# Patient Record
Sex: MALE | Race: WHITE | NOT HISPANIC OR LATINO | URBAN - METROPOLITAN AREA
[De-identification: names, ages, dates, MRNs, and addresses within clinical notes are randomized per-mention and may not be internally consistent; named-entity substitution may affect disease eponyms.]

---

## 2020-04-24 ENCOUNTER — OFFICE (OUTPATIENT)
Dept: URBAN - METROPOLITAN AREA TELEHEALTH 3 | Facility: TELEHEALTH | Age: 45
End: 2020-04-24
Payer: COMMERCIAL

## 2020-04-24 VITALS — WEIGHT: 205 LBS | HEIGHT: 73 IN

## 2020-04-24 DIAGNOSIS — R42 DIZZINESS AND GIDDINESS: ICD-10-CM

## 2020-04-24 DIAGNOSIS — R10.12 LEFT UPPER QUADRANT PAIN: ICD-10-CM

## 2020-04-24 DIAGNOSIS — R11.2 NAUSEA WITH VOMITING, UNSPECIFIED: ICD-10-CM

## 2020-04-24 DIAGNOSIS — R63.4 ABNORMAL WEIGHT LOSS: ICD-10-CM

## 2020-04-24 DIAGNOSIS — R43.2 PARAGEUSIA: ICD-10-CM

## 2020-04-24 PROCEDURE — 99204 OFFICE O/P NEW MOD 45 MIN: CPT | Mod: 95 | Performed by: INTERNAL MEDICINE

## 2020-04-30 ENCOUNTER — ON CAMPUS - OUTPATIENT (OUTPATIENT)
Dept: URBAN - METROPOLITAN AREA HOSPITAL 37 | Facility: HOSPITAL | Age: 45
End: 2020-04-30

## 2020-04-30 DIAGNOSIS — K31.7 POLYP OF STOMACH AND DUODENUM: ICD-10-CM

## 2020-04-30 DIAGNOSIS — R63.4 ABNORMAL WEIGHT LOSS: ICD-10-CM

## 2020-04-30 DIAGNOSIS — K92.0 HEMATEMESIS: ICD-10-CM

## 2020-04-30 DIAGNOSIS — K29.60 OTHER GASTRITIS WITHOUT BLEEDING: ICD-10-CM

## 2020-04-30 DIAGNOSIS — R11.2 NAUSEA WITH VOMITING, UNSPECIFIED: ICD-10-CM

## 2020-04-30 PROCEDURE — 43239 EGD BIOPSY SINGLE/MULTIPLE: CPT | Performed by: INTERNAL MEDICINE

## 2021-03-12 ENCOUNTER — OFFICE (OUTPATIENT)
Dept: URBAN - METROPOLITAN AREA CLINIC 102 | Facility: CLINIC | Age: 46
End: 2021-03-12

## 2021-03-12 ENCOUNTER — TELEHEALTH PROVIDED OTHER THAN IN PATIENT'S HOME (OUTPATIENT)
Dept: URBAN - METROPOLITAN AREA TELEHEALTH 12 | Facility: TELEHEALTH | Age: 46
End: 2021-03-12

## 2021-03-12 VITALS — WEIGHT: 200 LBS | HEIGHT: 73 IN

## 2021-03-12 DIAGNOSIS — R15.2 FECAL URGENCY: ICD-10-CM

## 2021-03-12 DIAGNOSIS — K92.1 MELENA: ICD-10-CM

## 2021-03-12 DIAGNOSIS — R19.4 CHANGE IN BOWEL HABIT: ICD-10-CM

## 2021-03-12 DIAGNOSIS — R32 UNSPECIFIED URINARY INCONTINENCE: ICD-10-CM

## 2021-03-12 PROCEDURE — 00038: CPT | Performed by: INTERNAL MEDICINE

## 2021-03-12 PROCEDURE — 99214 OFFICE O/P EST MOD 30 MIN: CPT | Mod: 95 | Performed by: INTERNAL MEDICINE

## 2021-03-22 ENCOUNTER — OFFICE (OUTPATIENT)
Dept: URBAN - METROPOLITAN AREA CLINIC 34 | Facility: CLINIC | Age: 46
End: 2021-03-22
Payer: COMMERCIAL

## 2021-03-22 DIAGNOSIS — Z11.59 ENCOUNTER FOR SCREENING FOR OTHER VIRAL DISEASES: ICD-10-CM

## 2021-03-22 PROCEDURE — G2023 SPECIMEN COLLECT COVID-19: HCPCS | Performed by: INTERNAL MEDICINE

## 2021-03-25 ENCOUNTER — OFFICE (OUTPATIENT)
Dept: URBAN - METROPOLITAN AREA CLINIC 98 | Facility: CLINIC | Age: 46
End: 2021-03-25

## 2021-03-25 VITALS
DIASTOLIC BLOOD PRESSURE: 54 MMHG | SYSTOLIC BLOOD PRESSURE: 97 MMHG | HEART RATE: 76 BPM | HEIGHT: 73 IN | SYSTOLIC BLOOD PRESSURE: 118 MMHG | HEART RATE: 83 BPM | HEART RATE: 79 BPM | DIASTOLIC BLOOD PRESSURE: 71 MMHG | DIASTOLIC BLOOD PRESSURE: 67 MMHG | SYSTOLIC BLOOD PRESSURE: 101 MMHG | HEART RATE: 81 BPM | HEART RATE: 83 BPM | HEART RATE: 78 BPM | SYSTOLIC BLOOD PRESSURE: 108 MMHG | HEART RATE: 79 BPM | OXYGEN SATURATION: 92 % | DIASTOLIC BLOOD PRESSURE: 71 MMHG | DIASTOLIC BLOOD PRESSURE: 67 MMHG | TEMPERATURE: 97.6 F | SYSTOLIC BLOOD PRESSURE: 131 MMHG | HEART RATE: 74 BPM | SYSTOLIC BLOOD PRESSURE: 106 MMHG | DIASTOLIC BLOOD PRESSURE: 69 MMHG | RESPIRATION RATE: 17 BRPM | SYSTOLIC BLOOD PRESSURE: 110 MMHG | OXYGEN SATURATION: 99 % | HEART RATE: 82 BPM | SYSTOLIC BLOOD PRESSURE: 116 MMHG | SYSTOLIC BLOOD PRESSURE: 118 MMHG | HEART RATE: 76 BPM | RESPIRATION RATE: 16 BRPM | HEART RATE: 82 BPM | DIASTOLIC BLOOD PRESSURE: 68 MMHG | SYSTOLIC BLOOD PRESSURE: 110 MMHG | DIASTOLIC BLOOD PRESSURE: 68 MMHG | RESPIRATION RATE: 20 BRPM | HEART RATE: 74 BPM | DIASTOLIC BLOOD PRESSURE: 83 MMHG | RESPIRATION RATE: 14 BRPM | DIASTOLIC BLOOD PRESSURE: 83 MMHG | SYSTOLIC BLOOD PRESSURE: 131 MMHG | OXYGEN SATURATION: 99 % | SYSTOLIC BLOOD PRESSURE: 106 MMHG | RESPIRATION RATE: 20 BRPM | SYSTOLIC BLOOD PRESSURE: 108 MMHG | SYSTOLIC BLOOD PRESSURE: 116 MMHG | OXYGEN SATURATION: 100 % | DIASTOLIC BLOOD PRESSURE: 81 MMHG | RESPIRATION RATE: 17 BRPM | RESPIRATION RATE: 18 BRPM | HEIGHT: 73 IN | RESPIRATION RATE: 14 BRPM | TEMPERATURE: 97.6 F | RESPIRATION RATE: 16 BRPM | OXYGEN SATURATION: 92 % | OXYGEN SATURATION: 98 % | TEMPERATURE: 97.5 F | SYSTOLIC BLOOD PRESSURE: 133 MMHG | RESPIRATION RATE: 18 BRPM | DIASTOLIC BLOOD PRESSURE: 81 MMHG | DIASTOLIC BLOOD PRESSURE: 72 MMHG | WEIGHT: 200 LBS | DIASTOLIC BLOOD PRESSURE: 72 MMHG | HEART RATE: 81 BPM | WEIGHT: 200 LBS | OXYGEN SATURATION: 98 % | TEMPERATURE: 97.5 F | SYSTOLIC BLOOD PRESSURE: 133 MMHG | DIASTOLIC BLOOD PRESSURE: 69 MMHG | OXYGEN SATURATION: 100 % | HEART RATE: 78 BPM | DIASTOLIC BLOOD PRESSURE: 54 MMHG | SYSTOLIC BLOOD PRESSURE: 97 MMHG | SYSTOLIC BLOOD PRESSURE: 101 MMHG

## 2021-03-25 DIAGNOSIS — R19.4 CHANGE IN BOWEL HABIT: ICD-10-CM

## 2021-03-25 DIAGNOSIS — K63.3 ULCER OF INTESTINE: ICD-10-CM

## 2021-03-25 DIAGNOSIS — K92.2 GASTROINTESTINAL HEMORRHAGE, UNSPECIFIED: ICD-10-CM

## 2021-03-25 DIAGNOSIS — D12.3 BENIGN NEOPLASM OF TRANSVERSE COLON: ICD-10-CM

## 2021-03-25 DIAGNOSIS — D12.5 BENIGN NEOPLASM OF SIGMOID COLON: ICD-10-CM

## 2021-03-25 DIAGNOSIS — K63.5 POLYP OF COLON: ICD-10-CM

## 2021-03-25 DIAGNOSIS — K92.1 MELENA: ICD-10-CM

## 2021-03-25 DIAGNOSIS — K63.89 OTHER SPECIFIED DISEASES OF INTESTINE: ICD-10-CM

## 2021-03-25 DIAGNOSIS — D12.0 BENIGN NEOPLASM OF CECUM: ICD-10-CM

## 2021-03-25 DIAGNOSIS — R15.2 FECAL URGENCY: ICD-10-CM

## 2021-03-25 DIAGNOSIS — R32 UNSPECIFIED URINARY INCONTINENCE: ICD-10-CM

## 2021-03-25 DIAGNOSIS — D12.2 BENIGN NEOPLASM OF ASCENDING COLON: ICD-10-CM

## 2021-03-25 LAB
GI LOWER HISTOLOGY - SPECM 1: CLINICAL INFORMATION: (no result)
GI LOWER HISTOLOGY - SPECM 1: CLINICAL INFORMATION: (no result)
GI LOWER HISTOLOGY - SPECM 1: CPT CODES: (no result)
GI LOWER HISTOLOGY - SPECM 1: CPT CODES: (no result)
GI LOWER HISTOLOGY - SPECM 1: DIAGNOSIS: (no result)
GI LOWER HISTOLOGY - SPECM 1: DIAGNOSIS: (no result)
GI LOWER HISTOLOGY - SPECM 1: GROSS DESCRIPTION: (no result)
GI LOWER HISTOLOGY - SPECM 1: GROSS DESCRIPTION: (no result)
GI LOWER HISTOLOGY - SPECM 1: INCOMING ICD CODE(S): (no result)
GI LOWER HISTOLOGY - SPECM 1: INCOMING ICD CODE(S): (no result)
GI LOWER HISTOLOGY - SPECM 1: MICROSCOPIC DESCRIPTION: (no result)
GI LOWER HISTOLOGY - SPECM 1: MICROSCOPIC DESCRIPTION: (no result)
GI LOWER HISTOLOGY - SPECM 1: PATHOLOGIST: (no result)
GI LOWER HISTOLOGY - SPECM 1: PATHOLOGIST: (no result)
GI LOWER HISTOLOGY - SPECM 1: REPORT TITLE: (no result)
GI LOWER HISTOLOGY - SPECM 1: REPORT TITLE: (no result)
GI LOWER HISTOLOGY - SPECM 1: SPECIMEN SOURCE: (no result)
GI LOWER HISTOLOGY - SPECM 1: SPECIMEN SOURCE: (no result)
GI LOWER HISTOLOGY - SPECM 1: SYNOPSIS: (no result)
GI LOWER HISTOLOGY - SPECM 1: SYNOPSIS: (no result)
GI LOWER HISTOLOGY - SPECM 2: CLINICAL INFORMATION: (no result)
GI LOWER HISTOLOGY - SPECM 2: CLINICAL INFORMATION: (no result)
GI LOWER HISTOLOGY - SPECM 2: CPT CODES: (no result)
GI LOWER HISTOLOGY - SPECM 2: CPT CODES: (no result)
GI LOWER HISTOLOGY - SPECM 2: DIAGNOSIS: (no result)
GI LOWER HISTOLOGY - SPECM 2: DIAGNOSIS: (no result)
GI LOWER HISTOLOGY - SPECM 2: GROSS DESCRIPTION: (no result)
GI LOWER HISTOLOGY - SPECM 2: GROSS DESCRIPTION: (no result)
GI LOWER HISTOLOGY - SPECM 2: INCOMING ICD CODE(S): (no result)
GI LOWER HISTOLOGY - SPECM 2: INCOMING ICD CODE(S): (no result)
GI LOWER HISTOLOGY - SPECM 2: MICROSCOPIC DESCRIPTION: (no result)
GI LOWER HISTOLOGY - SPECM 2: MICROSCOPIC DESCRIPTION: (no result)
GI LOWER HISTOLOGY - SPECM 2: PATHOLOGIST: (no result)
GI LOWER HISTOLOGY - SPECM 2: PATHOLOGIST: (no result)
GI LOWER HISTOLOGY - SPECM 2: REPORT TITLE: (no result)
GI LOWER HISTOLOGY - SPECM 2: REPORT TITLE: (no result)
GI LOWER HISTOLOGY - SPECM 2: SPECIMEN SOURCE: (no result)
GI LOWER HISTOLOGY - SPECM 2: SPECIMEN SOURCE: (no result)
GI LOWER HISTOLOGY - SPECM 2: SYNOPSIS: (no result)
GI LOWER HISTOLOGY - SPECM 2: SYNOPSIS: (no result)
GI LOWER HISTOLOGY - SPECM 3: CLINICAL INFORMATION: (no result)
GI LOWER HISTOLOGY - SPECM 3: CLINICAL INFORMATION: (no result)
GI LOWER HISTOLOGY - SPECM 3: CPT CODES: (no result)
GI LOWER HISTOLOGY - SPECM 3: CPT CODES: (no result)
GI LOWER HISTOLOGY - SPECM 3: DIAGNOSIS: (no result)
GI LOWER HISTOLOGY - SPECM 3: DIAGNOSIS: (no result)
GI LOWER HISTOLOGY - SPECM 3: GROSS DESCRIPTION: (no result)
GI LOWER HISTOLOGY - SPECM 3: GROSS DESCRIPTION: (no result)
GI LOWER HISTOLOGY - SPECM 3: INCOMING ICD CODE(S): (no result)
GI LOWER HISTOLOGY - SPECM 3: INCOMING ICD CODE(S): (no result)
GI LOWER HISTOLOGY - SPECM 3: PATHOLOGIST: (no result)
GI LOWER HISTOLOGY - SPECM 3: PATHOLOGIST: (no result)
GI LOWER HISTOLOGY - SPECM 3: REPORT TITLE: (no result)
GI LOWER HISTOLOGY - SPECM 3: REPORT TITLE: (no result)
GI LOWER HISTOLOGY - SPECM 3: SPECIMEN SOURCE: (no result)
GI LOWER HISTOLOGY - SPECM 3: SPECIMEN SOURCE: (no result)
GI LOWER HISTOLOGY - SPECM 3: SYNOPSIS: (no result)
GI LOWER HISTOLOGY - SPECM 3: SYNOPSIS: (no result)
GI LOWER HISTOLOGY - SPECM 4: CLINICAL INFORMATION: (no result)
GI LOWER HISTOLOGY - SPECM 4: CLINICAL INFORMATION: (no result)
GI LOWER HISTOLOGY - SPECM 4: CPT CODES: (no result)
GI LOWER HISTOLOGY - SPECM 4: CPT CODES: (no result)
GI LOWER HISTOLOGY - SPECM 4: DIAGNOSIS: (no result)
GI LOWER HISTOLOGY - SPECM 4: DIAGNOSIS: (no result)
GI LOWER HISTOLOGY - SPECM 4: GROSS DESCRIPTION: (no result)
GI LOWER HISTOLOGY - SPECM 4: GROSS DESCRIPTION: (no result)
GI LOWER HISTOLOGY - SPECM 4: INCOMING ICD CODE(S): (no result)
GI LOWER HISTOLOGY - SPECM 4: INCOMING ICD CODE(S): (no result)
GI LOWER HISTOLOGY - SPECM 4: PATHOLOGIST: (no result)
GI LOWER HISTOLOGY - SPECM 4: PATHOLOGIST: (no result)
GI LOWER HISTOLOGY - SPECM 4: REPORT TITLE: (no result)
GI LOWER HISTOLOGY - SPECM 4: REPORT TITLE: (no result)
GI LOWER HISTOLOGY - SPECM 4: SPECIMEN SOURCE: (no result)
GI LOWER HISTOLOGY - SPECM 4: SPECIMEN SOURCE: (no result)
GI LOWER HISTOLOGY - SPECM 4: SYNOPSIS: (no result)
GI LOWER HISTOLOGY - SPECM 4: SYNOPSIS: (no result)
GI LOWER HISTOLOGY - SPECM 5: CLINICAL INFORMATION: (no result)
GI LOWER HISTOLOGY - SPECM 5: CLINICAL INFORMATION: (no result)
GI LOWER HISTOLOGY - SPECM 5: CPT CODES: (no result)
GI LOWER HISTOLOGY - SPECM 5: CPT CODES: (no result)
GI LOWER HISTOLOGY - SPECM 5: DIAGNOSIS: (no result)
GI LOWER HISTOLOGY - SPECM 5: DIAGNOSIS: (no result)
GI LOWER HISTOLOGY - SPECM 5: GROSS DESCRIPTION: (no result)
GI LOWER HISTOLOGY - SPECM 5: GROSS DESCRIPTION: (no result)
GI LOWER HISTOLOGY - SPECM 5: INCOMING ICD CODE(S): (no result)
GI LOWER HISTOLOGY - SPECM 5: INCOMING ICD CODE(S): (no result)
GI LOWER HISTOLOGY - SPECM 5: PATHOLOGIST: (no result)
GI LOWER HISTOLOGY - SPECM 5: PATHOLOGIST: (no result)
GI LOWER HISTOLOGY - SPECM 5: REPORT TITLE: (no result)
GI LOWER HISTOLOGY - SPECM 5: REPORT TITLE: (no result)
GI LOWER HISTOLOGY - SPECM 5: SPECIMEN SOURCE: (no result)
GI LOWER HISTOLOGY - SPECM 5: SPECIMEN SOURCE: (no result)
GI LOWER HISTOLOGY - SPECM 5: SYNOPSIS: (no result)
GI LOWER HISTOLOGY - SPECM 5: SYNOPSIS: (no result)
GI LOWER POLYPECTOMY EXCISION - SPECM 1: CLINICAL INFORMATION: (no result)
GI LOWER POLYPECTOMY EXCISION - SPECM 1: CLINICAL INFORMATION: (no result)
GI LOWER POLYPECTOMY EXCISION - SPECM 1: CLINICAL OBSERVATIONS: (no result)
GI LOWER POLYPECTOMY EXCISION - SPECM 1: CLINICAL OBSERVATIONS: (no result)
GI LOWER POLYPECTOMY EXCISION - SPECM 1: CPT CODES: (no result)
GI LOWER POLYPECTOMY EXCISION - SPECM 1: CPT CODES: (no result)
GI LOWER POLYPECTOMY EXCISION - SPECM 1: DIAGNOSIS: (no result)
GI LOWER POLYPECTOMY EXCISION - SPECM 1: DIAGNOSIS: (no result)
GI LOWER POLYPECTOMY EXCISION - SPECM 1: GROSS DESCRIPTION: (no result)
GI LOWER POLYPECTOMY EXCISION - SPECM 1: GROSS DESCRIPTION: (no result)
GI LOWER POLYPECTOMY EXCISION - SPECM 1: INCOMING ICD CODE(S): (no result)
GI LOWER POLYPECTOMY EXCISION - SPECM 1: INCOMING ICD CODE(S): (no result)
GI LOWER POLYPECTOMY EXCISION - SPECM 1: MICROSCOPIC DESCRIPTION: (no result)
GI LOWER POLYPECTOMY EXCISION - SPECM 1: MICROSCOPIC DESCRIPTION: (no result)
GI LOWER POLYPECTOMY EXCISION - SPECM 1: PATHOLOGIST: (no result)
GI LOWER POLYPECTOMY EXCISION - SPECM 1: PATHOLOGIST: (no result)
GI LOWER POLYPECTOMY EXCISION - SPECM 1: REPORT TITLE: (no result)
GI LOWER POLYPECTOMY EXCISION - SPECM 1: REPORT TITLE: (no result)
GI LOWER POLYPECTOMY EXCISION - SPECM 1: SPECIMEN COMMENT: (no result)
GI LOWER POLYPECTOMY EXCISION - SPECM 1: SPECIMEN COMMENT: (no result)
GI LOWER POLYPECTOMY EXCISION - SPECM 1: SPECIMEN SOURCE: (no result)
GI LOWER POLYPECTOMY EXCISION - SPECM 1: SPECIMEN SOURCE: (no result)
GI LOWER POLYPECTOMY EXCISION - SPECM 1: SYNOPSIS: (no result)
GI LOWER POLYPECTOMY EXCISION - SPECM 1: SYNOPSIS: (no result)
GI LOWER POLYPECTOMY EXCISION - SPECM 2: CLINICAL INFORMATION: (no result)
GI LOWER POLYPECTOMY EXCISION - SPECM 2: CLINICAL INFORMATION: (no result)
GI LOWER POLYPECTOMY EXCISION - SPECM 2: CLINICAL OBSERVATIONS: (no result)
GI LOWER POLYPECTOMY EXCISION - SPECM 2: CLINICAL OBSERVATIONS: (no result)
GI LOWER POLYPECTOMY EXCISION - SPECM 2: CPT CODES: (no result)
GI LOWER POLYPECTOMY EXCISION - SPECM 2: CPT CODES: (no result)
GI LOWER POLYPECTOMY EXCISION - SPECM 2: DIAGNOSIS: (no result)
GI LOWER POLYPECTOMY EXCISION - SPECM 2: DIAGNOSIS: (no result)
GI LOWER POLYPECTOMY EXCISION - SPECM 2: GROSS DESCRIPTION: (no result)
GI LOWER POLYPECTOMY EXCISION - SPECM 2: GROSS DESCRIPTION: (no result)
GI LOWER POLYPECTOMY EXCISION - SPECM 2: INCOMING ICD CODE(S): (no result)
GI LOWER POLYPECTOMY EXCISION - SPECM 2: INCOMING ICD CODE(S): (no result)
GI LOWER POLYPECTOMY EXCISION - SPECM 2: PATHOLOGIST: (no result)
GI LOWER POLYPECTOMY EXCISION - SPECM 2: PATHOLOGIST: (no result)
GI LOWER POLYPECTOMY EXCISION - SPECM 2: REPORT TITLE: (no result)
GI LOWER POLYPECTOMY EXCISION - SPECM 2: REPORT TITLE: (no result)
GI LOWER POLYPECTOMY EXCISION - SPECM 2: SPECIMEN COMMENT: (no result)
GI LOWER POLYPECTOMY EXCISION - SPECM 2: SPECIMEN COMMENT: (no result)
GI LOWER POLYPECTOMY EXCISION - SPECM 2: SPECIMEN SOURCE: (no result)
GI LOWER POLYPECTOMY EXCISION - SPECM 2: SPECIMEN SOURCE: (no result)
GI LOWER POLYPECTOMY EXCISION - SPECM 2: SYNOPSIS: (no result)
GI LOWER POLYPECTOMY EXCISION - SPECM 2: SYNOPSIS: (no result)
GI LOWER POLYPECTOMY EXCISION - SPECM 3: CLINICAL INFORMATION: (no result)
GI LOWER POLYPECTOMY EXCISION - SPECM 3: CLINICAL INFORMATION: (no result)
GI LOWER POLYPECTOMY EXCISION - SPECM 3: CLINICAL OBSERVATIONS: (no result)
GI LOWER POLYPECTOMY EXCISION - SPECM 3: CLINICAL OBSERVATIONS: (no result)
GI LOWER POLYPECTOMY EXCISION - SPECM 3: CPT CODES: (no result)
GI LOWER POLYPECTOMY EXCISION - SPECM 3: CPT CODES: (no result)
GI LOWER POLYPECTOMY EXCISION - SPECM 3: DIAGNOSIS: (no result)
GI LOWER POLYPECTOMY EXCISION - SPECM 3: DIAGNOSIS: (no result)
GI LOWER POLYPECTOMY EXCISION - SPECM 3: GROSS DESCRIPTION: (no result)
GI LOWER POLYPECTOMY EXCISION - SPECM 3: GROSS DESCRIPTION: (no result)
GI LOWER POLYPECTOMY EXCISION - SPECM 3: INCOMING ICD CODE(S): (no result)
GI LOWER POLYPECTOMY EXCISION - SPECM 3: INCOMING ICD CODE(S): (no result)
GI LOWER POLYPECTOMY EXCISION - SPECM 3: PATHOLOGIST: (no result)
GI LOWER POLYPECTOMY EXCISION - SPECM 3: PATHOLOGIST: (no result)
GI LOWER POLYPECTOMY EXCISION - SPECM 3: REPORT TITLE: (no result)
GI LOWER POLYPECTOMY EXCISION - SPECM 3: REPORT TITLE: (no result)
GI LOWER POLYPECTOMY EXCISION - SPECM 3: SPECIMEN COMMENT: (no result)
GI LOWER POLYPECTOMY EXCISION - SPECM 3: SPECIMEN COMMENT: (no result)
GI LOWER POLYPECTOMY EXCISION - SPECM 3: SPECIMEN SOURCE: (no result)
GI LOWER POLYPECTOMY EXCISION - SPECM 3: SPECIMEN SOURCE: (no result)
GI LOWER POLYPECTOMY EXCISION - SPECM 3: SYNOPSIS: (no result)
GI LOWER POLYPECTOMY EXCISION - SPECM 3: SYNOPSIS: (no result)
GI LOWER POLYPECTOMY EXCISION - SPECM 4: CLINICAL INFORMATION: (no result)
GI LOWER POLYPECTOMY EXCISION - SPECM 4: CLINICAL INFORMATION: (no result)
GI LOWER POLYPECTOMY EXCISION - SPECM 4: CLINICAL OBSERVATIONS: (no result)
GI LOWER POLYPECTOMY EXCISION - SPECM 4: CLINICAL OBSERVATIONS: (no result)
GI LOWER POLYPECTOMY EXCISION - SPECM 4: CPT CODES: (no result)
GI LOWER POLYPECTOMY EXCISION - SPECM 4: CPT CODES: (no result)
GI LOWER POLYPECTOMY EXCISION - SPECM 4: DIAGNOSIS: (no result)
GI LOWER POLYPECTOMY EXCISION - SPECM 4: DIAGNOSIS: (no result)
GI LOWER POLYPECTOMY EXCISION - SPECM 4: GROSS DESCRIPTION: (no result)
GI LOWER POLYPECTOMY EXCISION - SPECM 4: GROSS DESCRIPTION: (no result)
GI LOWER POLYPECTOMY EXCISION - SPECM 4: INCOMING ICD CODE(S): (no result)
GI LOWER POLYPECTOMY EXCISION - SPECM 4: INCOMING ICD CODE(S): (no result)
GI LOWER POLYPECTOMY EXCISION - SPECM 4: PATHOLOGIST: (no result)
GI LOWER POLYPECTOMY EXCISION - SPECM 4: PATHOLOGIST: (no result)
GI LOWER POLYPECTOMY EXCISION - SPECM 4: REPORT TITLE: (no result)
GI LOWER POLYPECTOMY EXCISION - SPECM 4: REPORT TITLE: (no result)
GI LOWER POLYPECTOMY EXCISION - SPECM 4: SPECIMEN COMMENT: (no result)
GI LOWER POLYPECTOMY EXCISION - SPECM 4: SPECIMEN COMMENT: (no result)
GI LOWER POLYPECTOMY EXCISION - SPECM 4: SPECIMEN SOURCE: (no result)
GI LOWER POLYPECTOMY EXCISION - SPECM 4: SPECIMEN SOURCE: (no result)
GI LOWER POLYPECTOMY EXCISION - SPECM 4: SYNOPSIS: (no result)
GI LOWER POLYPECTOMY EXCISION - SPECM 4: SYNOPSIS: (no result)
Lab: (no result)

## 2021-03-25 PROCEDURE — 00811 ANES LWR INTST NDSC NOS: CPT | Mod: AA,P3,QS

## 2021-03-25 PROCEDURE — 00811 ANES LWR INTST NDSC NOS: CPT | Mod: QS,P3,AA

## 2021-03-25 RX ORDER — BUDESONIDE 9 MG/1
TABLET, EXTENDED RELEASE ORAL
Qty: 56 | Refills: -1 | Status: COMPLETED
Start: 2021-03-25 | End: 2021-06-29

## 2021-03-25 RX ORDER — MESALAMINE 1000 MG/1
SUPPOSITORY RECTAL
Qty: 60 | Refills: 2 | Status: COMPLETED
Start: 2021-03-25 | End: 2021-04-13

## 2021-04-13 ENCOUNTER — TELEHEALTH PROVIDED OTHER THAN IN PATIENT'S HOME (OUTPATIENT)
Dept: URBAN - METROPOLITAN AREA TELEHEALTH 3 | Facility: TELEHEALTH | Age: 46
End: 2021-04-13

## 2021-04-13 VITALS — WEIGHT: 195 LBS | HEIGHT: 73 IN

## 2021-04-13 DIAGNOSIS — Z86.010 PERSONAL HISTORY OF COLONIC POLYPS: ICD-10-CM

## 2021-04-13 DIAGNOSIS — K51.20 ULCERATIVE (CHRONIC) PROCTITIS WITHOUT COMPLICATIONS: ICD-10-CM

## 2021-04-13 DIAGNOSIS — R15.2 FECAL URGENCY: ICD-10-CM

## 2021-04-13 DIAGNOSIS — K92.1 MELENA: ICD-10-CM

## 2021-04-13 DIAGNOSIS — R32 UNSPECIFIED URINARY INCONTINENCE: ICD-10-CM

## 2021-04-13 PROCEDURE — 99214 OFFICE O/P EST MOD 30 MIN: CPT | Mod: 95 | Performed by: INTERNAL MEDICINE

## 2021-04-13 RX ORDER — MESALAMINE 375 MG/1
1.5 CAPSULE, EXTENDED RELEASE ORAL
Qty: 120 | Refills: 5 | Status: ACTIVE
Start: 2021-04-13

## 2021-04-13 RX ORDER — MESALAMINE 1000 MG/1
SUPPOSITORY RECTAL
Qty: 60 | Refills: 2 | Status: COMPLETED
Start: 2021-03-25 | End: 2021-04-13

## 2021-04-13 NOTE — SERVICEHPINOTES
PATIENT VERIFIED BY DATE OF BIRTH AND NAME. Patient has been consented for this telecommunication visit.   46 yo male with improved urgency and sx of diarrhea.  Pt had a colonoscopy with Dr. 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  Pt feels better on Uceris.  Pt denies any real bloating and cramping at this time.  Did not get any labs done at this point.  Pt was also given Canasa but this was denied by his insurance.  Pt here for fu.  ROS o/w negative.

## 2021-06-29 ENCOUNTER — TELEHEALTH PROVIDED OTHER THAN IN PATIENT'S HOME (OUTPATIENT)
Dept: URBAN - METROPOLITAN AREA TELEHEALTH 3 | Facility: TELEHEALTH | Age: 46
End: 2021-06-29

## 2021-06-29 VITALS — WEIGHT: 195 LBS | HEIGHT: 73 IN

## 2021-06-29 DIAGNOSIS — R15.2 FECAL URGENCY: ICD-10-CM

## 2021-06-29 DIAGNOSIS — K51.80 OTHER ULCERATIVE COLITIS WITHOUT COMPLICATIONS: ICD-10-CM

## 2021-06-29 DIAGNOSIS — R11.2 NAUSEA WITH VOMITING, UNSPECIFIED: ICD-10-CM

## 2021-06-29 PROCEDURE — 99214 OFFICE O/P EST MOD 30 MIN: CPT | Mod: 95 | Performed by: INTERNAL MEDICINE

## 2021-06-29 RX ORDER — BUDESONIDE 9 MG/1
TABLET, EXTENDED RELEASE ORAL
Qty: 56 | Refills: -1 | Status: COMPLETED
Start: 2021-03-25 | End: 2021-06-29

## 2022-07-22 ENCOUNTER — TELEHEALTH PROVIDED OTHER THAN IN PATIENT'S HOME (OUTPATIENT)
Dept: URBAN - METROPOLITAN AREA TELEHEALTH 7 | Facility: TELEHEALTH | Age: 47
End: 2022-07-22

## 2022-07-22 VITALS — WEIGHT: 200 LBS | HEIGHT: 73 IN

## 2022-07-22 DIAGNOSIS — K51.80 OTHER ULCERATIVE COLITIS WITHOUT COMPLICATIONS: ICD-10-CM

## 2022-07-22 PROCEDURE — 99214 OFFICE O/P EST MOD 30 MIN: CPT | Mod: 95 | Performed by: INTERNAL MEDICINE

## 2022-07-22 NOTE — SERVICEHPINOTES
PATIENT VERIFIED BY DATE OF BIRTH AND NAME. Patient has been consented for this telecommunication visit.   47 yo male with proctitis and on Humira.  Was doing well but had issues with this due the refrigeration issues.  Pt has issues due to the 2 week nature of the med as well.  Pt has bloating and issues with regurgitation of bile and mucus.  Pt has some sinus draining and gagging on his mucus as well.  Has some gas and takes Gax-X.  Denies any issues after eating.  Pt also has vertigo as well.  Pt states the gagging and mucus feels related.  Has incontinence currently at times due to the proctitis.  Pt ROS stable overall o/w.

## 2024-09-19 NOTE — SERVICEHPINOTES
PATIENT VERIFIED BY DATE OF BIRTH AND NAME. Patient has been consented for this telecommunication visit.   44 yo male with n/v in the past.  Pt moved to [place] and sx of n/v resolved.  Pt had EGD w/o issues.  Pt seeing a Chiropractor for neck issues - based on xrays, workup.  Pt here for follow up.  Pt now having issues  - has incontinence at times.  Pt also has some stress with separation from his partner.  Pt denies any specific foods causing this.  Pt has normal BMs at times.  Pt states this has been going on for 9 months or so.  Pt worried that a CNS cause is occurring.  Pt stools are not really watery and minimally loose.  Pt has some rectal bleeding - none in the stool but has some blood in his underwear.  Pt sx for about 1 year.  Pt has blood in the bowl at times.  ROS o/w negative.       no